# Patient Record
Sex: FEMALE | Race: WHITE
[De-identification: names, ages, dates, MRNs, and addresses within clinical notes are randomized per-mention and may not be internally consistent; named-entity substitution may affect disease eponyms.]

---

## 2019-01-08 ENCOUNTER — HOSPITAL ENCOUNTER (EMERGENCY)
Dept: HOSPITAL 62 - ER | Age: 19
Discharge: HOME | End: 2019-01-08
Payer: MEDICAID

## 2019-01-08 VITALS — DIASTOLIC BLOOD PRESSURE: 84 MMHG | SYSTOLIC BLOOD PRESSURE: 125 MMHG

## 2019-01-08 DIAGNOSIS — F17.210: ICD-10-CM

## 2019-01-08 DIAGNOSIS — B00.9: Primary | ICD-10-CM

## 2019-01-08 PROCEDURE — 99282 EMERGENCY DEPT VISIT SF MDM: CPT

## 2019-01-08 NOTE — ER DOCUMENT REPORT
ED ENT





- General


Chief Complaint: Skin Sore(s)


Stated Complaint: POSSIBLE COLD SORE


Time Seen by Provider: 01/08/19 13:07


Mode of Arrival: Ambulatory


Information source: Patient


Notes: 





-year-old female presented to ED for complaint of cold sores times 2 months.  

She states she has been trying everything over-the-counter she can get and is 

not had any relief from the cold sores.  She states sometimes she has to come in

and get a prescription for something.  She states she does not remember what it 

is but she needs a prescription now.  Patient is alert oriented respirations 

regular and unlabored speaking in full sentences.  Patient does have a several 

small herpetic type lesions to the mouth one on each corner and one on the upper

lip.





- HPI


Patient complains to provider of: Other - Hepatic type lesions to the lips none 

to the internal mouth


Onset: Other - Months


Onset/Duration: Intermittent


Quality of pain: Stabbing - Dropping


Severity: Moderate


Pain Level: 3


Context: Other.  denies: Injury, Recent Illness - Chronic cold sores to the 

mouth


Location of pain: Other - Hepatic type lesions to the lips


Associated symptoms: Other


Similar symptoms previously: Yes


Recently seen / treated by doctor: No





- Related Data


Allergies/Adverse Reactions: 


                                        





No Known Allergies Allergy (Unverified 01/08/19 12:06)


   











Past Medical History





- General


Information source: Patient





- Social History


Smoking Status: Current Every Day Smoker


Cigarette use (# per day): Yes - 1 cigarette a day


Chew tobacco use (# tins/day): No


Smoking Education Provided: Yes - 4 minutes


Frequency of alcohol use: Occasional


Drug Abuse: None


Lives with: Spouse/Significant other


Family History: Reviewed & Not Pertinent


Patient has suicidal ideation: No


Patient has homicidal ideation: No





- Past Medical History


Cardiac Medical History: Reports: None


Pulmonary Medical History: Reports: None


EENT Medical History: Reports: Other - Cold sores to the lips


Neurological Medical History: Reports: None


Endocrine Medical History: Reports: None


Renal/ Medical History: Reports: None


Malignancy Medical History: Reports: None


GI Medical History: Reports: None


Musculoskeletal Medical History: Reports None


Skin Medical History: Reports None


Psychiatric Medical History: Reports: None


Traumatic Medical History: Reports: None


Infectious Medical History: Reports: None


Surgical Hx: Negative


Past Surgical History: Reports: None





- Immunizations


Immunizations up to date: Yes





Review of Systems





- Review of Systems


Constitutional: No symptoms reported


EENT: No symptoms reported


Cardiovascular: No symptoms reported


Respiratory: No symptoms reported


Gastrointestinal: No symptoms reported


Genitourinary: No symptoms reported


Female Genitourinary: No symptoms reported


Musculoskeletal: No symptoms reported


Skin: No symptoms reported


Hematologic/Lymphatic: No symptoms reported


Neurological/Psychological: No symptoms reported


-: Yes All other systems reviewed and negative





Physical Exam





- Vital signs


Vitals: 


                                        











Temp Pulse Resp BP Pulse Ox


 


 98.1 F   69   16   134/87 H  100 


 


 01/08/19 12:11  01/08/19 12:11  01/08/19 12:11  01/08/19 12:11  01/08/19 12:11











Interpretation: Normal





- General


General appearance: Appears well, Alert





- HEENT


Head: Normocephalic, Atraumatic


Eyes: Normal


Pupils: PERRL


Ears: Normal


External canal: Normal


Tympanic membrane: Normal


Sinus: Normal


Nasal: Normal


Mouth/Lips: Lesions - Herpetic type lesions to the mouth


Pharynx: Normal


Neck: Normal





- Respiratory


Respiratory status: No respiratory distress


Chest status: Nontender


Breath sounds: Normal


Chest palpation: Normal





- Cardiovascular


Rhythm: Regular


Heart sounds: Normal auscultation


Murmur: No





- Abdominal


Inspection: Normal


Distension: No distension


Bowel sounds: Normal


Tenderness: Nontender


Organomegaly: No organomegaly





- Back


Back: Normal, Nontender





- Extremities


General upper extremity: Normal inspection, Nontender, Normal color, Normal ROM,

Normal temperature


General lower extremity: Normal inspection, Nontender, Normal color, Normal ROM,

Normal temperature, Normal weight bearing.  No: Britney's sign





- Neurological


Neuro grossly intact: Yes


Cognition: Normal


Orientation: AAOx4


Mary Coma Scale Eye Opening: Spontaneous


Mary Coma Scale Verbal: Oriented


Mary Coma Scale Motor: Obeys Commands


Mary Coma Scale Total: 15


Speech: Normal


Motor strength normal: LUE, RUE, LLE, RLE


Sensory: Normal





- Psychological


Associated symptoms: Normal affect, Normal mood





- Skin


Skin Temperature: Warm


Skin Moisture: Dry


Skin Color: Normal





Course





- Re-evaluation


Re-evalutation: 





01/08/19 22:33


She was given a prescription for acyclovir and instructed to follow-up with her 

primary doctor for any future lesions.  Patient verbalized understanding and 

agreement and thank you for the prescription.  Patient was discharged home.





- Vital Signs


Vital signs: 


                                        











Temp Pulse Resp BP Pulse Ox


 


 98.1 F   71   18   125/84   99 


 


 01/08/19 12:11  01/08/19 13:31  01/08/19 13:31  01/08/19 13:31  01/08/19 13:31














Discharge





- Discharge


Clinical Impression: 


 Herpes simplex





Condition: Stable


Disposition: HOME, SELF-CARE


Instructions:  Family Physicians / Practices


Additional Instructions: 


Herpes Simplex





     You have been diagnosed as having a herpes virus infection.  The herpes 

("cold sore") virus usually infects the areas around the mouth. However, it can 

cause infection on any skin surface.  It's particularly dangerous if infection 

occurs in the eye.


     On the initial infection, herpes blisters erupt over a large area. There is

usually fever and aching.  This infection takes about 14 days to resolve.  After

the initial infection, herpes sores can erupt on small areas (usually the lips),

then heal in about a week.  Sunburn, fever, local irritation, or even emotions 

can provoke a "fever blister" attack of herpes.


     Initial herpes infections can be treated with medication if severe.  

Subsequent attacks are usually given only local care to reduce symptoms; 

however, the physician may decide to prescribe anti-viral medication if your 

case warrants it.


     Call the doctor if you are worsening in any way.





Acyclovir





     Acyclovir (Zovirax) is used to treat infections caused by the Herpes family

of viruses.  It's available as capsules or ointment.


     Zovirax is most effective if started at the first sign of the viral 

outbreak.  It can decrease the severity and duration of symptoms. However, it 

doesn't eliminate the virus from the body completely.  If you're prone to 

repeated outbreaks of herpes, you'll continue to have attacks.


     Apply ointment with a disposable glove or finger-cot to avoid spreading the

virus with your finger.


     If pills have been prescribed, take them for the full recommended course.  

Occasionally, mild nausea or headaches may occur.


     Call the doctor if you develop wheezing, itching, rash, shortness of 

breath, or lightheadedness.








FOLLOW-UP CARE:


If you have been referred to a physician for follow-up care, call the 

physicians office for an appointment as you were instructed or within the next 

two days.  If you experience worsening or a significant change in your symptoms,

notify the physician immediately or return to the Emergency Department at any 

time for re-evaluation.


Prescriptions: 


Acyclovir [Zovirax] 800 mg PO 5XD #25 tablet


Forms:  Elevated Blood Pressure, Smoking Cessation Education